# Patient Record
Sex: MALE | Race: BLACK OR AFRICAN AMERICAN | NOT HISPANIC OR LATINO | Employment: FULL TIME | URBAN - METROPOLITAN AREA
[De-identification: names, ages, dates, MRNs, and addresses within clinical notes are randomized per-mention and may not be internally consistent; named-entity substitution may affect disease eponyms.]

---

## 2023-04-12 PROBLEM — R05.1 ACUTE COUGH: Status: ACTIVE | Noted: 2023-04-12

## 2023-04-22 ENCOUNTER — APPOINTMENT (OUTPATIENT)
Dept: LAB | Facility: HOSPITAL | Age: 38
End: 2023-04-22

## 2023-04-22 DIAGNOSIS — Z76.89 ENCOUNTER TO ESTABLISH CARE: ICD-10-CM

## 2023-04-22 LAB
ALBUMIN SERPL BCP-MCNC: 4.1 G/DL (ref 3.5–5)
ALP SERPL-CCNC: 54 U/L (ref 34–104)
ALT SERPL W P-5'-P-CCNC: 25 U/L (ref 7–52)
ANION GAP SERPL CALCULATED.3IONS-SCNC: 9 MMOL/L (ref 4–13)
AST SERPL W P-5'-P-CCNC: 16 U/L (ref 13–39)
BASOPHILS # BLD AUTO: 0.02 THOUSANDS/ΜL (ref 0–0.1)
BASOPHILS NFR BLD AUTO: 0 % (ref 0–1)
BILIRUB SERPL-MCNC: 0.64 MG/DL (ref 0.2–1)
BUN SERPL-MCNC: 14 MG/DL (ref 5–25)
CALCIUM SERPL-MCNC: 10.1 MG/DL (ref 8.4–10.2)
CHLORIDE SERPL-SCNC: 104 MMOL/L (ref 96–108)
CHOLEST SERPL-MCNC: 180 MG/DL
CO2 SERPL-SCNC: 29 MMOL/L (ref 21–32)
CREAT SERPL-MCNC: 1.12 MG/DL (ref 0.6–1.3)
EOSINOPHIL # BLD AUTO: 0.05 THOUSAND/ΜL (ref 0–0.61)
EOSINOPHIL NFR BLD AUTO: 0 % (ref 0–6)
ERYTHROCYTE [DISTWIDTH] IN BLOOD BY AUTOMATED COUNT: 14.3 % (ref 11.6–15.1)
GFR SERPL CREATININE-BSD FRML MDRD: 82 ML/MIN/1.73SQ M
GLUCOSE P FAST SERPL-MCNC: 80 MG/DL (ref 65–99)
HCT VFR BLD AUTO: 40.7 % (ref 36.5–49.3)
HDLC SERPL-MCNC: 64 MG/DL
HGB BLD-MCNC: 12.4 G/DL (ref 12–17)
IMM GRANULOCYTES # BLD AUTO: 0.04 THOUSAND/UL (ref 0–0.2)
IMM GRANULOCYTES NFR BLD AUTO: 0 % (ref 0–2)
LDLC SERPL CALC-MCNC: 108 MG/DL (ref 0–100)
LYMPHOCYTES # BLD AUTO: 1.28 THOUSANDS/ΜL (ref 0.6–4.47)
LYMPHOCYTES NFR BLD AUTO: 9 % (ref 14–44)
MCH RBC QN AUTO: 27.2 PG (ref 26.8–34.3)
MCHC RBC AUTO-ENTMCNC: 30.5 G/DL (ref 31.4–37.4)
MCV RBC AUTO: 89 FL (ref 82–98)
MONOCYTES # BLD AUTO: 0.29 THOUSAND/ΜL (ref 0.17–1.22)
MONOCYTES NFR BLD AUTO: 2 % (ref 4–12)
NEUTROPHILS # BLD AUTO: 12.42 THOUSANDS/ΜL (ref 1.85–7.62)
NEUTS SEG NFR BLD AUTO: 89 % (ref 43–75)
NONHDLC SERPL-MCNC: 116 MG/DL
NRBC BLD AUTO-RTO: 0 /100 WBCS
PLATELET # BLD AUTO: 207 THOUSANDS/UL (ref 149–390)
PMV BLD AUTO: 10.5 FL (ref 8.9–12.7)
POTASSIUM SERPL-SCNC: 4.6 MMOL/L (ref 3.5–5.3)
PROT SERPL-MCNC: 7.2 G/DL (ref 6.4–8.4)
RBC # BLD AUTO: 4.56 MILLION/UL (ref 3.88–5.62)
SODIUM SERPL-SCNC: 142 MMOL/L (ref 135–147)
TRIGL SERPL-MCNC: 38 MG/DL
TSH SERPL DL<=0.05 MIU/L-ACNC: 0.68 UIU/ML (ref 0.45–4.5)
WBC # BLD AUTO: 14.1 THOUSAND/UL (ref 4.31–10.16)

## 2023-04-26 ENCOUNTER — APPOINTMENT (OUTPATIENT)
Dept: LAB | Facility: HOSPITAL | Age: 38
End: 2023-04-26

## 2023-04-26 ENCOUNTER — HOSPITAL ENCOUNTER (OUTPATIENT)
Dept: CT IMAGING | Facility: HOSPITAL | Age: 38
Discharge: HOME | End: 2023-04-26

## 2023-04-26 ENCOUNTER — OFFICE VISIT (OUTPATIENT)
Dept: INTERNAL MEDICINE CLINIC | Facility: CLINIC | Age: 38
End: 2023-04-26

## 2023-04-26 VITALS
TEMPERATURE: 97 F | HEART RATE: 74 BPM | HEIGHT: 72 IN | WEIGHT: 157 LBS | OXYGEN SATURATION: 99 % | RESPIRATION RATE: 16 BRPM | BODY MASS INDEX: 21.26 KG/M2 | DIASTOLIC BLOOD PRESSURE: 60 MMHG | SYSTOLIC BLOOD PRESSURE: 100 MMHG

## 2023-04-26 DIAGNOSIS — R05.2 SUBACUTE COUGH: ICD-10-CM

## 2023-04-26 DIAGNOSIS — R68.83 CHILLS: Primary | ICD-10-CM

## 2023-04-26 DIAGNOSIS — R05.1 ACUTE COUGH: ICD-10-CM

## 2023-04-26 DIAGNOSIS — R68.83 CHILLS: ICD-10-CM

## 2023-04-26 DIAGNOSIS — R07.81 PLEURITIC CHEST PAIN: Primary | ICD-10-CM

## 2023-04-26 DIAGNOSIS — R07.81 PLEURITIC CHEST PAIN: ICD-10-CM

## 2023-04-26 DIAGNOSIS — R51.9 ACUTE NONINTRACTABLE HEADACHE, UNSPECIFIED HEADACHE TYPE: ICD-10-CM

## 2023-04-26 LAB
BASOPHILS # BLD AUTO: 0.03 THOUSANDS/ΜL (ref 0–0.1)
BASOPHILS NFR BLD AUTO: 1 % (ref 0–1)
CRP SERPL QL: 65.7 MG/L
EOSINOPHIL # BLD AUTO: 0.08 THOUSAND/ΜL (ref 0–0.61)
EOSINOPHIL NFR BLD AUTO: 2 % (ref 0–6)
ERYTHROCYTE [DISTWIDTH] IN BLOOD BY AUTOMATED COUNT: 13.8 % (ref 11.6–15.1)
ERYTHROCYTE [SEDIMENTATION RATE] IN BLOOD: 88 MM/HOUR (ref 0–14)
HBV CORE AB SER QL: NORMAL
HBV CORE IGM SER QL: NORMAL
HBV SURFACE AG SER QL: NORMAL
HCT VFR BLD AUTO: 42.1 % (ref 36.5–49.3)
HCV AB SER QL: NORMAL
HGB BLD-MCNC: 12.5 G/DL (ref 12–17)
HIV 1+2 AB+HIV1 P24 AG SERPL QL IA: NORMAL
HIV 2 AB SERPL QL IA: NORMAL
HIV1 AB SERPL QL IA: NORMAL
HIV1 P24 AG SERPL QL IA: NORMAL
IMM GRANULOCYTES # BLD AUTO: 0.02 THOUSAND/UL (ref 0–0.2)
IMM GRANULOCYTES NFR BLD AUTO: 0 % (ref 0–2)
LDH SERPL-CCNC: 140 U/L (ref 140–271)
LYMPHOCYTES # BLD AUTO: 1.39 THOUSANDS/ΜL (ref 0.6–4.47)
LYMPHOCYTES NFR BLD AUTO: 26 % (ref 14–44)
MCH RBC QN AUTO: 26.9 PG (ref 26.8–34.3)
MCHC RBC AUTO-ENTMCNC: 29.7 G/DL (ref 31.4–37.4)
MCV RBC AUTO: 91 FL (ref 82–98)
MONOCYTES # BLD AUTO: 0.44 THOUSAND/ΜL (ref 0.17–1.22)
MONOCYTES NFR BLD AUTO: 8 % (ref 4–12)
NEUTROPHILS # BLD AUTO: 3.43 THOUSANDS/ΜL (ref 1.85–7.62)
NEUTS SEG NFR BLD AUTO: 63 % (ref 43–75)
NRBC BLD AUTO-RTO: 0 /100 WBCS
PLATELET # BLD AUTO: 224 THOUSANDS/UL (ref 149–390)
PMV BLD AUTO: 10.4 FL (ref 8.9–12.7)
RBC # BLD AUTO: 4.64 MILLION/UL (ref 3.88–5.62)
WBC # BLD AUTO: 5.39 THOUSAND/UL (ref 4.31–10.16)

## 2023-04-26 RX ADMIN — IOHEXOL 85 ML: 350 INJECTION, SOLUTION INTRAVENOUS at 10:52

## 2023-04-26 NOTE — PROGRESS NOTES
INTERNAL MEDICINE OFFICE VISIT  [facility] Internal Medicine- [place]    NAME: Christi Caldera  AGE: 43 y o  SEX: male    DATE OF ENCOUNTER: 4/26/2023    Assessment and Plan/History of Present Illness     Here today for same-day appointment  No significant past medical history    Here for reevaluation of pleuritic chest pain, chills, headaches, ongoing cough, postnasal drip  See prior office note for details  Patient has had ongoing cough for about 2 months at this point  Productive of thick yellowish mucus  Had 2 trips to urgent care, initially treated with prednisone/Z-Sami and later Medrol Dosepak  Some initial improvement with Flonase, Mucinex, Sudafed  Having severe episodes of chills to the point where he has had to layer up and take hot showers  Headaches described as a pressure sensation in the center of the forehead, no associated photophobia or phonophobia    Felt fine this last week but had recurrence of symptoms this prior Friday  He has been having intermittent pleuritic chest discomfort and difficulty with taking deep breaths  This provokes coughing episodes  Continues to have issues with intermittent chills, headache, postnasal drip with sore throat, cough with thick yellowish mucus  He has not checked his temperature at home    He denies any rash, lymphadenopathy, dysphagia, nausea, vomiting, diarrhea, dysuria  His daughter has been in  for the past 6 months  He did take a trip to [facility] about 1 month ago for a friend's wedding  He initially tested for COVID/flu/RSV when he went to urgent care back in February  He denies any contact with homeless/incarcerated population  No history of IV drug use  He denies any family history of clotting events or pulmonary embolism    Recent chest x-ray unremarkable  Basic labs mostly unremarkable, mild leukocytosis, neutrophil predominant  LFTs within normal limits      1  Pleuritic chest pain  -Cause of symptoms is unclear    I still think it is most likely this is related to sequelae of a viral syndrome  Given his recurrent episodes of pleuritic chest pain, would like to rule out PE with CTA  -We will obtain additional blood work to evaluate for infectious and inflammatory sources  -Will treat symptomatically pending the above workup     - CTA chest pe study; Future    2  Chills  - EBV acute panel; Future  - Mononucleosis screen; Future  - Sedimentation rate, automated; Future  - C-reactive protein; Future  - LD,Blood; Future  - CBC and differential; Future  - : HIV 1/2 AB/AG w Reflex [facility] for 2 yr old and above; Future  - Chronic Hepatitis Panel; Future    3  Acute nonintractable headache, unspecified headache type    4  Acute cough    5  Subacute cough  - B pertussis IgG/IgM Ab; Future          Orders Placed This Encounter   Procedures   • CTA chest pe study   • EBV acute panel   • Mononucleosis screen   • Sedimentation rate, automated   • C-reactive protein   • LD,Blood   • CBC and differential   • : HIV 1/2 AB/AG w Reflex [facility] for 2 yr old and above   • Chronic Hepatitis Panel   • B pertussis IgG/IgM Ab       Chief Complaint     Chief Complaint   Patient presents with   • Follow-up     Body aches/ fever/headaches       Review of Systems     10 point ROS negative except per HPI    The following portions of the patient's history were reviewed and updated as appropriate: allergies, current medications, past family history, past medical history, past social history, past surgical history and problem list     Active Problem List     Patient Active Problem List   Diagnosis   • Acute cough       Objective     /60 (BP Location: Left arm, Patient Position: Sitting, Cuff Size: Standard)   Pulse 74   Temp (!) 97 °F (36 1 °C)   Resp 16   Ht 6' (1 829 m)   Wt 71 2 kg (157 lb)   SpO2 99%   BMI 21 29 kg/m²     Physical Exam  Constitutional:       Appearance: Normal appearance  He is not ill-appearing     HENT:      Head: Normocephalic and atraumatic  Eyes:      General: No scleral icterus  Right eye: No discharge  Left eye: No discharge  Cardiovascular:      Rate and Rhythm: Normal rate and regular rhythm  Heart sounds: No murmur heard  No friction rub  Pulmonary:      Effort: Pulmonary effort is normal       Breath sounds: Normal breath sounds  No wheezing or rales  Musculoskeletal:         General: No swelling or tenderness  Skin:     Findings: No erythema or rash  Neurological:      Mental Status: He is alert  Mental status is at baseline  Gait: Gait normal    Psychiatric:         Mood and Affect: Mood normal          Behavior: Behavior normal          Pertinent Laboratory/Diagnostic Studies:  XR chest pa & lateral    Result Date: 4/23/2023  Impression: No acute cardiopulmonary disease  Workstation performed: [id number]       Images and diagnostics reviewed     Current Medications     Current Outpatient Medications:   •  benzonatate (TESSALON PERLES) 100 mg capsule, Take 1 capsule (100 mg total) by mouth 3 (three) times a day as needed for cough, Disp: 20 capsule, Rfl: 0    Health Maintenance     Health Maintenance   Topic Date Due   • Hepatitis C Screening  Never done   • COVID-19 Vaccine (1) Never done   • HIV Screening  Never done   • Annual Physical  Never done   • DTaP,Tdap,and Td Vaccines (1 - Tdap) Never done   • Influenza Vaccine (1) 06/30/2023 (Originally 9/1/2022)   • Depression Screening  04/12/2024   • BMI: Adult  04/26/2024   • Pneumococcal Vaccine: Pediatrics (0 to 5 Years) and At-Risk Patients (6 to 59 Years)  Aged Out   • HIB Vaccine  Aged Out   • IPV Vaccine  Aged Out   • Hepatitis A Vaccine  Aged Out   • Meningococcal ACWY Vaccine  Aged Out   • HPV Vaccine  Aged Out       There is no immunization history on file for this patient  BOBO Shankar  Internal Medicine [address], [facility] [address]  [place], [address] [facility]  Office: [phone]  Fax: [phone]

## 2023-04-27 LAB
EBV NA IGG SER IA-ACNC: 286 U/ML (ref 0–17.9)
EBV VCA IGG SER IA-ACNC: >600 U/ML (ref 0–17.9)
EBV VCA IGM SER IA-ACNC: <36 U/ML (ref 0–35.9)
HETEROPH AB SER QL: NEGATIVE
INTERPRETATION: ABNORMAL

## 2023-04-28 LAB
B PERT IGA SER QL IA: 1.7 INDEX (ref 0–0.9)
B PERT IGG SER-ACNC: 3.77 INDEX (ref 0–0.94)
B PERT IGM SER QL IA: <1 INDEX (ref 0–0.9)

## 2023-05-22 ENCOUNTER — PATIENT MESSAGE (OUTPATIENT)
Dept: INTERNAL MEDICINE CLINIC | Facility: CLINIC | Age: 38
End: 2023-05-22

## 2023-05-22 DIAGNOSIS — J06.9 UPPER RESPIRATORY TRACT INFECTION, UNSPECIFIED TYPE: ICD-10-CM

## 2023-05-22 DIAGNOSIS — M79.10 MYALGIA: Primary | ICD-10-CM

## 2023-05-23 RX ORDER — PREDNISONE 20 MG/1
40 TABLET ORAL DAILY
Qty: 10 TABLET | Refills: 0 | Status: SHIPPED | OUTPATIENT
Start: 2023-05-23 | End: 2023-05-28

## 2023-05-23 RX ORDER — AZITHROMYCIN 250 MG/1
TABLET, FILM COATED ORAL
Qty: 6 TABLET | Refills: 0 | Status: SHIPPED | OUTPATIENT
Start: 2023-05-23 | End: 2023-05-28

## 2023-05-24 NOTE — TELEPHONE ENCOUNTER
From: Gerber Frederick  To: Jaspreet Michellesoledad Madden  Sent: 5/22/2023 4:32 PM EDT  Subject: Recurring Issue     Juan A Gonzalez I woke up at about 2am with the same issue  Not being able to take deep breaths, chills and a headache  I took some Sudafed and the chills went away but the difficulty taking deep breaths, headache and body aches have persisted all day  I did not seek medical attention because I have my daughter today and she started off the day battling a fever  I took Tylenol for the headache, that helped but it's still lingering  I'm started to get worried and frustrated by this  I reached out to my landlord to have a radon test ran in my apartment but I'm running out ideas

## 2023-06-11 PROBLEM — R05.1 ACUTE COUGH: Status: RESOLVED | Noted: 2023-04-12 | Resolved: 2023-06-11

## 2023-12-27 ENCOUNTER — OFFICE VISIT (OUTPATIENT)
Dept: INTERNAL MEDICINE CLINIC | Facility: CLINIC | Age: 38
End: 2023-12-27
Payer: COMMERCIAL

## 2023-12-27 VITALS
DIASTOLIC BLOOD PRESSURE: 62 MMHG | HEIGHT: 73 IN | WEIGHT: 164 LBS | BODY MASS INDEX: 21.74 KG/M2 | HEART RATE: 80 BPM | TEMPERATURE: 98.4 F | RESPIRATION RATE: 13 BRPM | SYSTOLIC BLOOD PRESSURE: 124 MMHG

## 2023-12-27 DIAGNOSIS — R05.2 SUBACUTE COUGH: ICD-10-CM

## 2023-12-27 DIAGNOSIS — J30.2 SEASONAL ALLERGIC RHINITIS, UNSPECIFIED TRIGGER: Primary | ICD-10-CM

## 2023-12-27 PROCEDURE — 99214 OFFICE O/P EST MOD 30 MIN: CPT | Performed by: INTERNAL MEDICINE

## 2023-12-27 RX ORDER — AZITHROMYCIN 250 MG/1
TABLET, FILM COATED ORAL DAILY
Qty: 6 TABLET | Refills: 0 | Status: SHIPPED | OUTPATIENT
Start: 2023-12-27 | End: 2024-01-01

## 2023-12-27 RX ORDER — LORATADINE 10 MG/1
10 TABLET ORAL DAILY
Qty: 90 TABLET | Refills: 0 | Status: SHIPPED | OUTPATIENT
Start: 2023-12-27

## 2023-12-27 RX ORDER — FLUTICASONE PROPIONATE 50 MCG
1 SPRAY, SUSPENSION (ML) NASAL DAILY
Qty: 15.8 ML | Refills: 0 | Status: SHIPPED | OUTPATIENT
Start: 2023-12-27

## 2023-12-27 RX ORDER — PREDNISONE 20 MG/1
20 TABLET ORAL DAILY
Qty: 5 TABLET | Refills: 0 | Status: SHIPPED | OUTPATIENT
Start: 2023-12-27 | End: 2024-01-01

## 2023-12-27 NOTE — PROGRESS NOTES
INTERNAL MEDICINE OFFICE VISIT  [facility] Internal Medicine- [place]    NAME: Pio Jacobo  AGE: 38 y.o. SEX: male    DATE OF ENCOUNTER: 12/27/2023    Assessment and Plan/History of Present Illness     Here today for sick visit    Patient reports intermittent sore throat, postnasal drip since mid November.  Slight shortness of breath and cough.  Denies any fevers or chills.  He had similar prolonged symptoms last spring likely related to episode of bronchitis.  Does not smoke cigarettes, will occasionally smoke marijuana.  No prior history of asthma    Exam unremarkable today.  Lungs clear to auscultation    Plan:  -Symptoms possibly multifactorial.  Likely allergic rhinitis.  Treat with Flonase and oral antihistamine  -Given his concerns about possible recurrence of bronchitis, we will treat with course of prednisone and azithromycin  -Check PFTs to evaluate for underlying lung disease      1. Seasonal allergic rhinitis, unspecified trigger  -     fluticasone (FLONASE) 50 mcg/act nasal spray; 1 spray into each nostril daily  -     loratadine (CLARITIN) 10 mg tablet; Take 1 tablet (10 mg total) by mouth daily    2. Subacute cough  -     Complete PFT with post bronchodilator; Future  -     azithromycin (Zithromax) 250 mg tablet; Take 2 tablets (500 mg total) by mouth daily for 1 day, THEN 1 tablet (250 mg total) daily for 4 days.  -     predniSONE 20 mg tablet; Take 1 tablet (20 mg total) by mouth daily for 5 days               Orders Placed This Encounter   Procedures   • Complete PFT with post bronchodilator       Chief Complaint     Chief Complaint   Patient presents with   • Sore Throat   • post nasal drip   • Breathing Difficulty     Had bronchitis last year, he does not want to get that sick again.       Review of Systems     10 point ROS negative except per HPI    The following portions of the patient's history were reviewed and updated as appropriate: allergies, current medications, past family history, past  "medical history, past social history, past surgical history and problem list.    Active Problem List     Patient Active Problem List   Diagnosis   (none) - all problems resolved or deleted       Objective     /62 (BP Location: Left arm, Patient Position: Sitting, Cuff Size: Standard)   Pulse 80   Temp 98.4 °F (36.9 °C)   Resp 13   Ht 6' 1\" (1.854 m)   Wt 74.4 kg (164 lb)   BMI 21.64 kg/m²     Physical Exam  HENT:      Right Ear: Tympanic membrane normal.      Left Ear: Tympanic membrane normal.      Mouth/Throat:      Mouth: Mucous membranes are moist. No oral lesions.      Pharynx: No oropharyngeal exudate.      Tonsils: No tonsillar exudate.   Cardiovascular:      Rate and Rhythm: Normal rate and regular rhythm.      Heart sounds: Normal heart sounds. No murmur heard.  Pulmonary:      Effort: Pulmonary effort is normal.      Breath sounds: Normal breath sounds. No wheezing, rhonchi or rales.         Pertinent Laboratory/Diagnostic Studies:  CTA chest pe study    Result Date: 4/26/2023  Impression: 1. No pulmonary embolism or acute intrathoracic process. 2. Bibasilar linear subsegmental atelectasis/scarring and pulmonary cysts. Workstation performed: [id number]       Images and diagnostics reviewed     Current Medications     Current Outpatient Medications:   •  azithromycin (Zithromax) 250 mg tablet, Take 2 tablets (500 mg total) by mouth daily for 1 day, THEN 1 tablet (250 mg total) daily for 4 days., Disp: 6 tablet, Rfl: 0  •  fluticasone (FLONASE) 50 mcg/act nasal spray, 1 spray into each nostril daily, Disp: 15.8 mL, Rfl: 0  •  loratadine (CLARITIN) 10 mg tablet, Take 1 tablet (10 mg total) by mouth daily, Disp: 90 tablet, Rfl: 0  •  predniSONE 20 mg tablet, Take 1 tablet (20 mg total) by mouth daily for 5 days, Disp: 5 tablet, Rfl: 0    Health Maintenance     Health Maintenance   Topic Date Due   • COVID-19 Vaccine (1) Never done   • Annual Physical  Never done   • DTaP,Tdap,and Td Vaccines (1 - " Tdap) Never done   • Influenza Vaccine (1) Never done   • Depression Screening  04/12/2024   • BMI: Adult  04/26/2024   • HIV Screening  Completed   • Hepatitis C Screening  Completed   • Pneumococcal Vaccine: Pediatrics (0 to 5 Years) and At-Risk Patients (6 to 64 Years)  Aged Out   • HIB Vaccine  Aged Out   • IPV Vaccine  Aged Out   • Hepatitis A Vaccine  Aged Out   • Meningococcal ACWY Vaccine  Aged Out   • HPV Vaccine  Aged Out       There is no immunization history on file for this patient.    Jaspreet Rene D.O.  [facility] [place] [address]  Office: [phone]  Fax: [phone]

## 2024-01-08 ENCOUNTER — HOSPITAL ENCOUNTER (OUTPATIENT)
Dept: PULMONOLOGY | Facility: HOSPITAL | Age: 39
Discharge: HOME | End: 2024-01-08
Attending: INTERNAL MEDICINE
Payer: COMMERCIAL

## 2024-01-08 DIAGNOSIS — R05.2 SUBACUTE COUGH: ICD-10-CM

## 2024-01-08 PROCEDURE — 94726 PLETHYSMOGRAPHY LUNG VOLUMES: CPT

## 2024-01-08 PROCEDURE — 94729 DIFFUSING CAPACITY: CPT | Performed by: INTERNAL MEDICINE

## 2024-01-08 PROCEDURE — 94060 EVALUATION OF WHEEZING: CPT

## 2024-01-08 PROCEDURE — 94729 DIFFUSING CAPACITY: CPT

## 2024-01-08 PROCEDURE — 94060 EVALUATION OF WHEEZING: CPT | Performed by: INTERNAL MEDICINE

## 2024-01-08 PROCEDURE — 94726 PLETHYSMOGRAPHY LUNG VOLUMES: CPT | Performed by: INTERNAL MEDICINE

## 2024-01-08 PROCEDURE — 94760 N-INVAS EAR/PLS OXIMETRY 1: CPT

## 2024-01-08 RX ORDER — ALBUTEROL SULFATE 2.5 MG/3ML
2.5 SOLUTION RESPIRATORY (INHALATION) ONCE
Status: COMPLETED | OUTPATIENT
Start: 2024-01-08 | End: 2024-01-08

## 2024-01-08 RX ADMIN — ALBUTEROL SULFATE 2.5 MG: 2.5 SOLUTION RESPIRATORY (INHALATION) at 10:13

## 2024-05-28 ENCOUNTER — TELEPHONE (OUTPATIENT)
Age: 39
End: 2024-05-28

## 2024-05-28 DIAGNOSIS — J06.9 ACUTE URI: Primary | ICD-10-CM

## 2024-05-28 NOTE — TELEPHONE ENCOUNTER
Pt called in with the following symptoms for past week is getting worse now. Sore throat, cold , congestion and shortness of breath headache. Offered to schedule an appt he mentioned unable to make any days in this week due to his work. Requesting if Dr. Rene would prescribe him to Zpack and call in the med to his regular pharmacy. And call back the patient to update on the same please. Thanks

## 2024-05-29 RX ORDER — AZITHROMYCIN 250 MG/1
TABLET, FILM COATED ORAL
Qty: 6 TABLET | Refills: 0 | Status: SHIPPED | OUTPATIENT
Start: 2024-05-29 | End: 2024-06-03

## 2024-06-14 ENCOUNTER — OFFICE VISIT (OUTPATIENT)
Dept: INTERNAL MEDICINE CLINIC | Facility: CLINIC | Age: 39
End: 2024-06-14
Payer: COMMERCIAL

## 2024-06-14 VITALS
WEIGHT: 181 LBS | TEMPERATURE: 97.3 F | DIASTOLIC BLOOD PRESSURE: 80 MMHG | HEART RATE: 121 BPM | HEIGHT: 73 IN | RESPIRATION RATE: 18 BRPM | OXYGEN SATURATION: 96 % | SYSTOLIC BLOOD PRESSURE: 140 MMHG | BODY MASS INDEX: 23.99 KG/M2

## 2024-06-14 DIAGNOSIS — Z00.00 ANNUAL PHYSICAL EXAM: Primary | ICD-10-CM

## 2024-06-14 PROCEDURE — 99396 PREV VISIT EST AGE 40-64: CPT | Performed by: INTERNAL MEDICINE

## 2024-06-14 NOTE — PROGRESS NOTES
Adult Annual Physical  Name: Pio Jacobo      : 1985      MRN: [id number]  Encounter Provider: Jaspreet Rene DO  Encounter Date: 2024   Encounter department: [facility] INTERNAL MEDICINE [facility]    Here today for annual physical    He does not have any significant concerns today.  Has had some weight gain since our last visit.  Office weight of 181 pounds today, last April he was closer to 157 pounds.  He had been on night shift for approximately 6 months and had some bad eating habits.  He is now back on dayshift and will be focusing on improving eating habits.  Initially had some anxiety and sleeping difficulties when transitioning back to dayshift but it seems this is improving    No findings of concern on exam  Will obtain routine labs    Return to the office in 1 year, sooner if needed    Assessment & Plan   1. Annual physical exam  -     CBC and differential; Future  -     Comprehensive metabolic panel; Future  -     Lipid Panel with Direct LDL reflex; Future  -     TSH, 3rd generation with Free T4 reflex; Future  -     Hemoglobin A1C; Future  Immunizations and preventive care screenings were discussed with patient today. Appropriate education was printed on patient's after visit summary.    Discussed risks and benefits of prostate cancer screening. We discussed the controversial history of PSA screening for prostate cancer in the [place] as well as the risk of over detection and over treatment of prostate cancer by way of PSA screening.  The patient understands that PSA blood testing is an imperfect way to screen for prostate cancer and that elevated PSA levels in the blood may also be caused by infection, inflammation, prostatic trauma or manipulation, urological procedures, or by benign prostatic enlargement.    The role of the digital rectal examination in prostate cancer screening was also discussed and I discussed with him that there is large interobserver variability in the  "findings of digital rectal examination.    Counseling:  Alcohol/drug use: discussed moderation in alcohol intake, the recommendations for healthy alcohol use, and avoidance of illicit drug use.  Dental Health: discussed importance of regular tooth brushing, flossing, and dental visits.  Injury prevention: discussed safety/seat belts, safety helmets, smoke detectors, carbon dioxide detectors, and smoking near bedding or upholstery.  Sexual health: discussed sexually transmitted diseases, partner selection, use of condoms, avoidance of unintended pregnancy, and contraceptive alternatives.  Exercise: the importance of regular exercise/physical activity was discussed. Recommend exercise 3-5 times per week for at least 30 minutes.         History of Present Illness     Adult Annual Physical:  Patient presents for annual physical.     Depression Screening:  - PHQ-2 Score: 0    General Health:    - Hearing: normal hearing right ear and normal hearing left ear.  - Vision: no vision problems.  - Dental: regular dental visits.    Review of Systems      Objective     /80 (BP Location: Left arm, Patient Position: Sitting, Cuff Size: Standard)   Pulse (!) 121   Temp (!) 97.3 °F (36.3 °C)   Resp 18   Ht 6' 1\" (1.854 m)   Wt 82.1 kg (181 lb)   SpO2 96%   BMI 23.88 kg/m²     Physical Exam  Constitutional:       Appearance: Normal appearance. He is not ill-appearing.   HENT:      Head: Normocephalic and atraumatic.   Eyes:      General: No scleral icterus.        Right eye: No discharge.         Left eye: No discharge.   Cardiovascular:      Rate and Rhythm: Normal rate and regular rhythm.      Heart sounds: No murmur heard.     No friction rub.   Pulmonary:      Effort: Pulmonary effort is normal.      Breath sounds: Normal breath sounds. No wheezing or rales.   Abdominal:      General: Abdomen is flat. There is no distension.      Palpations: Abdomen is soft.      Tenderness: There is no abdominal tenderness. "   Musculoskeletal:         General: No swelling, tenderness or deformity.   Skin:     General: Skin is warm and dry.      Findings: No erythema.   Neurological:      Mental Status: He is alert and oriented to person, place, and time. Mental status is at baseline.      Motor: No weakness.   Psychiatric:         Mood and Affect: Mood normal.         Behavior: Behavior normal.       Administrative Statements

## 2024-06-15 ENCOUNTER — APPOINTMENT (OUTPATIENT)
Dept: LAB | Facility: HOSPITAL | Age: 39
End: 2024-06-15
Payer: COMMERCIAL

## 2024-06-15 DIAGNOSIS — Z00.00 ANNUAL PHYSICAL EXAM: ICD-10-CM

## 2024-06-15 LAB
ALBUMIN SERPL BCP-MCNC: 4.5 G/DL (ref 3.5–5)
ALP SERPL-CCNC: 44 U/L (ref 34–104)
ALT SERPL W P-5'-P-CCNC: 26 U/L (ref 7–52)
ANION GAP SERPL CALCULATED.3IONS-SCNC: 10 MMOL/L (ref 4–13)
AST SERPL W P-5'-P-CCNC: 21 U/L (ref 13–39)
BASOPHILS # BLD AUTO: 0.02 THOUSANDS/ÂΜL (ref 0–0.1)
BASOPHILS NFR BLD AUTO: 1 % (ref 0–1)
BILIRUB SERPL-MCNC: 0.56 MG/DL (ref 0.2–1)
BUN SERPL-MCNC: 15 MG/DL (ref 5–25)
CALCIUM SERPL-MCNC: 9.7 MG/DL (ref 8.4–10.2)
CHLORIDE SERPL-SCNC: 101 MMOL/L (ref 96–108)
CHOLEST SERPL-MCNC: 215 MG/DL
CO2 SERPL-SCNC: 29 MMOL/L (ref 21–32)
CREAT SERPL-MCNC: 1.12 MG/DL (ref 0.6–1.3)
EOSINOPHIL # BLD AUTO: 0.06 THOUSAND/ÂΜL (ref 0–0.61)
EOSINOPHIL NFR BLD AUTO: 2 % (ref 0–6)
ERYTHROCYTE [DISTWIDTH] IN BLOOD BY AUTOMATED COUNT: 13 % (ref 11.6–15.1)
EST. AVERAGE GLUCOSE BLD GHB EST-MCNC: 131 MG/DL
GFR SERPL CREATININE-BSD FRML MDRD: 81 ML/MIN/1.73SQ M
GLUCOSE P FAST SERPL-MCNC: 91 MG/DL (ref 65–99)
HBA1C MFR BLD: 6.2 %
HCT VFR BLD AUTO: 43.1 % (ref 36.5–49.3)
HDLC SERPL-MCNC: 47 MG/DL
HGB BLD-MCNC: 13.2 G/DL (ref 12–17)
IMM GRANULOCYTES # BLD AUTO: 0 THOUSAND/UL (ref 0–0.2)
IMM GRANULOCYTES NFR BLD AUTO: 0 % (ref 0–2)
LDLC SERPL CALC-MCNC: 145 MG/DL (ref 0–100)
LYMPHOCYTES # BLD AUTO: 1.41 THOUSANDS/ÂΜL (ref 0.6–4.47)
LYMPHOCYTES NFR BLD AUTO: 47 % (ref 14–44)
MCH RBC QN AUTO: 27.7 PG (ref 26.8–34.3)
MCHC RBC AUTO-ENTMCNC: 30.6 G/DL (ref 31.4–37.4)
MCV RBC AUTO: 90 FL (ref 82–98)
MONOCYTES # BLD AUTO: 0.33 THOUSAND/ÂΜL (ref 0.17–1.22)
MONOCYTES NFR BLD AUTO: 11 % (ref 4–12)
NEUTROPHILS # BLD AUTO: 1.16 THOUSANDS/ÂΜL (ref 1.85–7.62)
NEUTS SEG NFR BLD AUTO: 39 % (ref 43–75)
NRBC BLD AUTO-RTO: 0 /100 WBCS
PLATELET # BLD AUTO: 209 THOUSANDS/UL (ref 149–390)
PMV BLD AUTO: 10.6 FL (ref 8.9–12.7)
POTASSIUM SERPL-SCNC: 4.1 MMOL/L (ref 3.5–5.3)
PROT SERPL-MCNC: 7.9 G/DL (ref 6.4–8.4)
RBC # BLD AUTO: 4.77 MILLION/UL (ref 3.88–5.62)
SODIUM SERPL-SCNC: 140 MMOL/L (ref 135–147)
TRIGL SERPL-MCNC: 115 MG/DL
TSH SERPL DL<=0.05 MIU/L-ACNC: 1.07 UIU/ML (ref 0.45–4.5)
WBC # BLD AUTO: 2.98 THOUSAND/UL (ref 4.31–10.16)

## 2024-06-15 PROCEDURE — 84443 ASSAY THYROID STIM HORMONE: CPT

## 2024-06-15 PROCEDURE — 83036 HEMOGLOBIN GLYCOSYLATED A1C: CPT

## 2024-06-15 PROCEDURE — 80061 LIPID PANEL: CPT

## 2024-06-15 PROCEDURE — 80053 COMPREHEN METABOLIC PANEL: CPT

## 2024-06-15 PROCEDURE — 85025 COMPLETE CBC W/AUTO DIFF WBC: CPT

## 2024-06-15 PROCEDURE — 36415 COLL VENOUS BLD VENIPUNCTURE: CPT

## 2024-06-16 DIAGNOSIS — E78.5 DYSLIPIDEMIA: ICD-10-CM

## 2024-06-16 DIAGNOSIS — R73.03 PREDIABETES: Primary | ICD-10-CM

## 2024-08-28 ENCOUNTER — OFFICE VISIT (OUTPATIENT)
Dept: INTERNAL MEDICINE CLINIC | Facility: CLINIC | Age: 39
End: 2024-08-28
Payer: COMMERCIAL

## 2024-08-28 VITALS
HEIGHT: 73 IN | RESPIRATION RATE: 18 BRPM | BODY MASS INDEX: 23.99 KG/M2 | DIASTOLIC BLOOD PRESSURE: 80 MMHG | HEART RATE: 94 BPM | OXYGEN SATURATION: 96 % | SYSTOLIC BLOOD PRESSURE: 120 MMHG | WEIGHT: 181 LBS | TEMPERATURE: 97 F

## 2024-08-28 DIAGNOSIS — K62.5 RECTAL BLEEDING: Primary | ICD-10-CM

## 2024-08-28 PROCEDURE — 99214 OFFICE O/P EST MOD 30 MIN: CPT | Performed by: INTERNAL MEDICINE

## 2024-08-28 NOTE — PROGRESS NOTES
INTERNAL MEDICINE OFFICE VISIT  [facility] Internal Medicine- [place]    NAME: Pio Jacobo  AGE: 39 y.o. SEX: male    DATE OF ENCOUNTER: 8/28/2024    Assessment and Plan/History of Present Illness     Here today for evaluation of rectal bleeding    Patient reports onset of rectal bleeding 3 or 4 days ago.  He has noticed a significant amount of blood in the toilet bowl with bowel movements in addition to blood with wiping.  Denies any prior history of GI bleed.  States he had surgery for testicular torsion 8 years ago and had a history of hemorrhoids after that but only had blood on the toilet paper with wiping    He is not having any pain with bowel movements.  He does have a burning sensation in the right lower abdominal area.  Denies any fevers, chills.  No recent ingestion of undercooked meats or exotic foods.  No recent travel.  He states his mom who is 59 years old was diagnosed with Crohn's colitis earlier this year.    On exam, there is possible evidence of a external hemorrhoid versus skin tag in the 10 o'clock position    Patient has not had colonoscopy previously    Plan:  -New onset rectal bleeding.  Consider hemorrhoidal bleed versus diverticular bleed?  Family history of Crohn's disease noted.  Recommend evaluation with GI for probable colonoscopy.  Referral placed  -Will check CBC, CMP, iron panel.  He is hemodynamically stable  -If rectal bleeding becomes intractable or he develops significant dizziness or lightheadedness, he should be seen in the ER to consider expedited evaluation      1. Rectal bleeding  -     Ambulatory Referral to Gastroenterology; Future  -     CBC and differential; Future  -     Comprehensive metabolic panel; Future  -     Iron Panel (Includes Ferritin, Iron Sat%, Iron, and TIBC); Future             Orders Placed This Encounter   Procedures   • CBC and differential   • Comprehensive metabolic panel   • Ambulatory Referral to Gastroenterology       Chief Complaint     Chief  "Complaint   Patient presents with   • Rectal Bleeding     Experiencing blood in stool right lower abdomen burning sensation / pt refused tdap        Review of Systems     10 point ROS negative except per HPI    The following portions of the patient's history were reviewed and updated as appropriate: allergies, current medications, past family history, past medical history, past social history, past surgical history and problem list.    Objective     /80 (BP Location: Left arm, Patient Position: Sitting, Cuff Size: Standard)   Pulse 94   Temp (!) 97 °F (36.1 °C)   Resp 18   Ht 6' 1\" (1.854 m)   Wt 82.1 kg (181 lb)   SpO2 96%   BMI 23.88 kg/m²     Physical Exam  Abdominal:      General: Abdomen is flat. There is no distension.      Palpations: Abdomen is soft.      Tenderness: There is no abdominal tenderness. There is no guarding or rebound.           Current Medications   No current outpatient medications on file.      Jaspreet Rene D.O.  [facility] Internal Medicine - [address]  [place] [address]  Office: [phone]  Fax: [phone]      "

## 2024-08-31 ENCOUNTER — APPOINTMENT (EMERGENCY)
Dept: CT IMAGING | Facility: HOSPITAL | Age: 39
End: 2024-08-31
Payer: COMMERCIAL

## 2024-08-31 ENCOUNTER — HOSPITAL ENCOUNTER (EMERGENCY)
Facility: HOSPITAL | Age: 39
Discharge: HOME | End: 2024-08-31
Attending: EMERGENCY MEDICINE
Payer: COMMERCIAL

## 2024-08-31 VITALS
OXYGEN SATURATION: 97 % | DIASTOLIC BLOOD PRESSURE: 78 MMHG | BODY MASS INDEX: 24.03 KG/M2 | TEMPERATURE: 98.1 F | SYSTOLIC BLOOD PRESSURE: 171 MMHG | WEIGHT: 182.1 LBS | RESPIRATION RATE: 18 BRPM | HEART RATE: 86 BPM

## 2024-08-31 DIAGNOSIS — K92.2 LOWER GI BLEED: ICD-10-CM

## 2024-08-31 DIAGNOSIS — K62.5 RECTAL BLEEDING: Primary | ICD-10-CM

## 2024-08-31 LAB
ALBUMIN SERPL BCG-MCNC: 4.5 G/DL (ref 3.5–5)
ALP SERPL-CCNC: 44 U/L (ref 34–104)
ALT SERPL W P-5'-P-CCNC: 17 U/L (ref 7–52)
ANION GAP SERPL CALCULATED.3IONS-SCNC: 6 MMOL/L (ref 4–13)
APTT PPP: 28 SECONDS (ref 23–34)
AST SERPL W P-5'-P-CCNC: 20 U/L (ref 13–39)
BASOPHILS # BLD AUTO: 0.02 THOUSANDS/ÂΜL (ref 0–0.1)
BASOPHILS NFR BLD AUTO: 1 % (ref 0–1)
BILIRUB SERPL-MCNC: 0.53 MG/DL (ref 0.2–1)
BUN SERPL-MCNC: 17 MG/DL (ref 5–25)
CALCIUM SERPL-MCNC: 9.8 MG/DL (ref 8.4–10.2)
CHLORIDE SERPL-SCNC: 101 MMOL/L (ref 96–108)
CO2 SERPL-SCNC: 32 MMOL/L (ref 21–32)
CREAT SERPL-MCNC: 1.23 MG/DL (ref 0.6–1.3)
EOSINOPHIL # BLD AUTO: 0.08 THOUSAND/ÂΜL (ref 0–0.61)
EOSINOPHIL NFR BLD AUTO: 3 % (ref 0–6)
ERYTHROCYTE [DISTWIDTH] IN BLOOD BY AUTOMATED COUNT: 13.2 % (ref 11.6–15.1)
GFR SERPL CREATININE-BSD FRML MDRD: 72 ML/MIN/1.73SQ M
GLUCOSE SERPL-MCNC: 92 MG/DL (ref 65–140)
HCT VFR BLD AUTO: 43.8 % (ref 36.5–49.3)
HGB BLD-MCNC: 13.8 G/DL (ref 12–17)
IMM GRANULOCYTES # BLD AUTO: 0 THOUSAND/UL (ref 0–0.2)
IMM GRANULOCYTES NFR BLD AUTO: 0 % (ref 0–2)
INR PPP: 0.98 (ref 0.85–1.19)
LYMPHOCYTES # BLD AUTO: 1.65 THOUSANDS/ÂΜL (ref 0.6–4.47)
LYMPHOCYTES NFR BLD AUTO: 54 % (ref 14–44)
MCH RBC QN AUTO: 28.5 PG (ref 26.8–34.3)
MCHC RBC AUTO-ENTMCNC: 31.5 G/DL (ref 31.4–37.4)
MCV RBC AUTO: 90 FL (ref 82–98)
MONOCYTES # BLD AUTO: 0.3 THOUSAND/ÂΜL (ref 0.17–1.22)
MONOCYTES NFR BLD AUTO: 10 % (ref 4–12)
NEUTROPHILS # BLD AUTO: 0.95 THOUSANDS/ÂΜL (ref 1.85–7.62)
NEUTS SEG NFR BLD AUTO: 32 % (ref 43–75)
NRBC BLD AUTO-RTO: 0 /100 WBCS
PLATELET # BLD AUTO: 193 THOUSANDS/UL (ref 149–390)
PMV BLD AUTO: 10 FL (ref 8.9–12.7)
POTASSIUM SERPL-SCNC: 4.3 MMOL/L (ref 3.5–5.3)
PROT SERPL-MCNC: 7.6 G/DL (ref 6.4–8.4)
PROTHROMBIN TIME: 13.3 SECONDS (ref 12.3–15)
RBC # BLD AUTO: 4.85 MILLION/UL (ref 3.88–5.62)
SODIUM SERPL-SCNC: 139 MMOL/L (ref 135–147)
WBC # BLD AUTO: 3 THOUSAND/UL (ref 4.31–10.16)

## 2024-08-31 PROCEDURE — 85025 COMPLETE CBC W/AUTO DIFF WBC: CPT | Performed by: EMERGENCY MEDICINE

## 2024-08-31 PROCEDURE — 36415 COLL VENOUS BLD VENIPUNCTURE: CPT | Performed by: EMERGENCY MEDICINE

## 2024-08-31 PROCEDURE — 99285 EMERGENCY DEPT VISIT HI MDM: CPT

## 2024-08-31 PROCEDURE — 85730 THROMBOPLASTIN TIME PARTIAL: CPT | Performed by: EMERGENCY MEDICINE

## 2024-08-31 PROCEDURE — 74177 CT ABD & PELVIS W/CONTRAST: CPT

## 2024-08-31 PROCEDURE — 99284 EMERGENCY DEPT VISIT MOD MDM: CPT | Performed by: EMERGENCY MEDICINE

## 2024-08-31 PROCEDURE — 80053 COMPREHEN METABOLIC PANEL: CPT | Performed by: EMERGENCY MEDICINE

## 2024-08-31 PROCEDURE — 96360 HYDRATION IV INFUSION INIT: CPT

## 2024-08-31 PROCEDURE — 85610 PROTHROMBIN TIME: CPT | Performed by: EMERGENCY MEDICINE

## 2024-08-31 RX ADMIN — SODIUM CHLORIDE 1000 ML: 0.9 INJECTION, SOLUTION INTRAVENOUS at 07:18

## 2024-08-31 RX ADMIN — IOHEXOL 100 ML: 350 INJECTION, SOLUTION INTRAVENOUS at 08:25

## 2024-08-31 NOTE — Clinical Note
Pio Jacobo was seen and treated in our emergency department on 8/31/2024.                Diagnosis:     Pio  .    He may return on this date: 09/02/2024         If you have any questions or concerns, please don't hesitate to call.      Venancio Cole MD    ______________________________           _______________          _______________  Hospital Representative                              Date                                Time

## 2024-08-31 NOTE — ED PROVIDER NOTES
History  Chief Complaint   Patient presents with    Rectal Bleeding     Patient c/o bright red blood with Bms for about a week     Patient presents for rectal bleeding for over 1 week he saw his doctor a few days ago who did a physical exam excluding the rectal exam and referred him to GI told him to come back if the bleeding go worse patient said he is having bloody stools some blood when he wipes is gotten little worse however he is not having any pain feeling lightheaded short of breath no palpitations no chest pain no abdominal pain with denies any bleeding disorders or any bleeding disorders in the family denies taking any aspirin or Motrin or any other anticoagulants mom was recently diagnosed with ulcerative colitis denies melena patient denies any travel outside the country          None       History reviewed. No pertinent past medical history.    Past Surgical History:   Procedure Laterality Date    SURGERY SCROTAL / TESTICULAR Bilateral 2017       Family History   Problem Relation Age of Onset    Chronic bronchitis Mother      I have reviewed and agree with the history as documented.    E-Cigarette/Vaping    E-Cigarette Use Never User      E-Cigarette/Vaping Substances     Social History     Tobacco Use    Smoking status: Never    Smokeless tobacco: Never   Vaping Use    Vaping status: Never Used   Substance Use Topics    Alcohol use: Yes     Alcohol/week: 4.0 standard drinks of alcohol     Types: 1 Glasses of wine, 2 Cans of beer, 1 Shots of liquor per week     Comment: socially    Drug use: Yes     Types: Marijuana     Comment: occationally       Review of Systems   Constitutional:  Negative for chills and fever.   Eyes:  Negative for pain.   Respiratory:  Negative for shortness of breath.    Cardiovascular:  Negative for chest pain and palpitations.   Gastrointestinal:  Positive for blood in stool. Negative for abdominal pain, diarrhea and vomiting.   Genitourinary:  Negative for dysuria.    Musculoskeletal:  Negative for arthralgias and back pain.   Skin:  Negative for color change and rash.   Neurological:  Negative for dizziness, seizures, syncope and weakness.   All other systems reviewed and are negative.      Physical Exam  Physical Exam  Vitals and nursing note reviewed.   Constitutional:       General: He is not in acute distress.     Appearance: He is well-developed.   HENT:      Head: Normocephalic and atraumatic.   Eyes:      Conjunctiva/sclera: Conjunctivae normal.   Cardiovascular:      Rate and Rhythm: Normal rate and regular rhythm.      Heart sounds: No murmur heard.  Pulmonary:      Effort: Pulmonary effort is normal. No respiratory distress.      Breath sounds: Normal breath sounds.   Abdominal:      General: Abdomen is flat. Bowel sounds are normal. There is no distension.      Palpations: Abdomen is soft. There is no mass.      Tenderness: There is no abdominal tenderness. There is no right CVA tenderness, left CVA tenderness, guarding or rebound.      Hernia: No hernia is present.   Genitourinary:     Comments: Rectal exam no external hemorrhoid no fissure rectal exam no masses felt brown stool heme-negative  Musculoskeletal:         General: No swelling.      Cervical back: Neck supple.   Skin:     General: Skin is warm and dry.      Capillary Refill: Capillary refill takes less than 2 seconds.      Coloration: Skin is not jaundiced.   Neurological:      General: No focal deficit present.      Mental Status: He is alert.   Psychiatric:         Mood and Affect: Mood normal.         Vital Signs  ED Triage Vitals [08/31/24 0711]   Temperature Pulse Respirations Blood Pressure SpO2   98.1 °F (36.7 °C) 86 18 (!) 171/78 97 %      Temp Source Heart Rate Source Patient Position - Orthostatic VS BP Location FiO2 (%)   Oral Monitor Sitting Left arm --      Pain Score       --           Vitals:    08/31/24 0711   BP: (!) 171/78   Pulse: 86   Patient Position - Orthostatic VS: Sitting          Visual Acuity      ED Medications  Medications   sodium chloride 0.9 % bolus 1,000 mL (0 mL Intravenous Stopped 8/31/24 0835)   iohexol (OMNIPAQUE) 350 MG/ML injection (MULTI-DOSE) 100 mL (100 mL Intravenous Given 8/31/24 0825)       Diagnostic Studies  Results Reviewed       Procedure Component Value Units Date/Time    Calprotectin,Fecal [[id number]]     Lab Status: No result Specimen: Stool     Giardia lamblia, EIA and Ova and Parasites Examination [[id number]]     Lab Status: No result Specimen: Stool     Clostridium difficile toxin by PCR with EIA [[id number]]     Lab Status: No result Specimen: Stool     Protime-INR [[id number]]  (Normal) Collected: 08/31/24 0717    Lab Status: Final result Specimen: Blood from Arm, Right Updated: 08/31/24 0808     Protime 13.3 seconds      INR 0.98    Narrative:      INR Therapeutic Range    Indication                                             INR Range      Atrial Fibrillation                                               2.0-3.0  Hypercoagulable State                                    2.0.2.3  Left Ventricular Asist Device                            2.0-3.0  Mechanical Heart Valve                                  -    Aortic(with afib, MI, embolism, HF, LA enlargement,    and/or coagulopathy)                                     2.0-3.0 (2.5-3.5)     Mitral                                                             2.5-3.5  Prosthetic/Bioprosthetic Heart Valve               2.0-3.0  Venous thromboembolism (VTE: VT, PE        2.0-3.0    APTT [[id number]]  (Normal) Collected: 08/31/24 0717    Lab Status: Final result Specimen: Blood from Arm, Right Updated: 08/31/24 0808     PTT 28 seconds     Comprehensive metabolic panel [[id number]] Collected: 08/31/24 0717    Lab Status: Final result Specimen: Blood from Arm, Right Updated: 08/31/24 0756     Sodium 139 mmol/L      Potassium 4.3 mmol/L      Chloride 101 mmol/L      CO2 32 mmol/L      ANION GAP 6 mmol/L      BUN 17  mg/dL      Creatinine 1.23 mg/dL      Glucose 92 mg/dL      Calcium 9.8 mg/dL      AST 20 U/L      ALT 17 U/L      Alkaline Phosphatase 44 U/L      Total Protein 7.6 g/dL      Albumin 4.5 g/dL      Total Bilirubin 0.53 mg/dL      eGFR 72 ml/min/1.73sq m     Narrative:      National Kid[employer] Foundation guidelines for Chronic Kidney Disease (CKD):     Stage 1 with normal or high GFR (GFR > 90 mL/min/1.73 square meters)    Stage 2 Mild CKD (GFR = 60-89 mL/min/1.73 square meters)    Stage 3A Moderate CKD (GFR = 45-59 mL/min/1.73 square meters)    Stage 3B Moderate CKD (GFR = 30-44 mL/min/1.73 square meters)    Stage 4 Severe CKD (GFR = 15-29 mL/min/1.73 square meters)    Stage 5 End Stage CKD (GFR <15 mL/min/1.73 square meters)  Note: GFR calculation is accurate only with a steady state creatinine    CBC and differential [[id number]]  (Abnormal) Collected: 08/31/24 0717    Lab Status: Final result Specimen: Blood from Arm, Right Updated: 08/31/24 0733     WBC 3.00 Thousand/uL      RBC 4.85 Million/uL      Hemoglobin 13.8 g/dL      Hematocrit 43.8 %      MCV 90 fL      MCH 28.5 pg      MCHC 31.5 g/dL      RDW 13.2 %      MPV 10.0 fL      Platelets 193 Thousands/uL      nRBC 0 /100 WBCs      Segmented % 32 %      Immature Grans % 0 %      Lymphocytes % 54 %      Monocytes % 10 %      Eosinophils Relative 3 %      Basophils Relative 1 %      Absolute Neutrophils 0.95 Thousands/µL      Absolute Immature Grans 0.00 Thousand/uL      Absolute Lymphocytes 1.65 Thousands/µL      Absolute Monocytes 0.30 Thousand/µL      Eosinophils Absolute 0.08 Thousand/µL      Basophils Absolute 0.02 Thousands/µL                    CT abdomen pelvis with contrast   Final Result by Eben Lowery MD (08/31 0856)      No acute abnormality identified in the abdomen or pelvis.         Workstation performed: [id number]                    Procedures  Procedures         ED Course                                 SBIRT 22yo+      Flowsheet  Row Most Recent Value   Initial Alcohol Screen: US AUDIT-C     1. How often do you have a drink containing alcohol? 0 Filed at: 08/31/2024 0712   2. How many drinks containing alcohol do you have on a typical day you are drinking?  0 Filed at: 08/31/2024 0712   3a. Male UNDER 65: How often do you have five or more drinks on one occasion? 0 Filed at: 08/31/2024 0712   3b. FEMALE Any Age, or MALE 65+: How often do you have 4 or more drinks on one occassion? 0 Filed at: 08/31/2024 0712   Audit-C Score 0 Filed at: 08/31/2024 0712   TRUE: How many times in the past year have you...    Used an illegal drug or used a prescription medication for non-medical reasons? Never Filed at: 08/31/2024 0712                      Medical Decision Making  With rectal bleeding for over a week etiology unclear patient is hemodynamically stable asymptomatic workup was negative hemoglobin was over 13 CT was negative electrolytes LFTs unremarkable PT PTT unremarkable Case was discussed with  Gi patient does have a follow-up ointment on Friday patient will need a colonoscopy stool studies were added he does not recommend starting on any steroids or any antibiotics at this time patient is given strict instructions return if increasing bleeding feeling lightheaded short of breath new problems or concerns    Amount and/or Complexity of Data Reviewed  Labs: ordered.  Radiology: ordered.    Risk  Prescription drug management.                 Disposition  Final diagnoses:   Rectal bleeding   Lower GI bleed     Time reflects when diagnosis was documented in both MDM as applicable and the Disposition within this note       Time User Action Codes Description Comment    8/31/2024  9:30 AM Venancio Cole Add [K62.5] Rectal bleeding     8/31/2024  9:30 AM Venancio Cole Add [K92.2] Lower GI bleed           ED Disposition       ED Disposition   Discharge    Condition   Stable    Date/Time   Sat Aug 31, 2024 4730    Comment   Pio Jacobo discharge to  home/self care.                   Follow-up Information       Follow up With Specialties Details Why Contact Info Additional Information    Jaspreet Rene, DO Internal Medicine In 3 days  [address]  [address]  [facility] [address] [phone]       [facility] [place] [facility] In 3 days  [address]  [address]  [facility] [address] [phone] [facility] [place], [address], [place], [place], [address] [phone]            There are no discharge medications for this patient.      No discharge procedures on file.    PDMP Review       None            ED Provider  Electronically Signed by             Venancio Cole MD  08/31/24 3146

## 2024-09-06 ENCOUNTER — OFFICE VISIT (OUTPATIENT)
Dept: GASTROENTEROLOGY | Facility: CLINIC | Age: 39
End: 2024-09-06
Payer: COMMERCIAL

## 2024-09-06 VITALS
WEIGHT: 182 LBS | OXYGEN SATURATION: 98 % | HEIGHT: 73 IN | TEMPERATURE: 97.1 F | HEART RATE: 76 BPM | SYSTOLIC BLOOD PRESSURE: 110 MMHG | BODY MASS INDEX: 24.12 KG/M2 | DIASTOLIC BLOOD PRESSURE: 74 MMHG

## 2024-09-06 DIAGNOSIS — K62.5 RECTAL BLEEDING: ICD-10-CM

## 2024-09-06 PROCEDURE — 99204 OFFICE O/P NEW MOD 45 MIN: CPT | Performed by: DIETITIAN, REGISTERED

## 2024-09-06 NOTE — PROGRESS NOTES
[facility] - Outpatient Consultation New Patient  Pio Jacobo 39 y.o. male MRN: [id number]  Encounter: [id number]          ASSESSMENT AND PLAN:    1.  Rectal bleeding  Patient reports 2-week history of BRBPR with visible clots, seen in the toilet bowl water.  He denies any significant abdominal pain, changes in bowel habits, or rectal pain.  He sometimes has burning in the RLQ.  He denies any constipation or diarrhea and endorses very regular bowel habits.  He denies any weight loss.  He denies any shortness of breath or dizziness.  He has headaches on days that he does not drink coffee.  No prior colonoscopy.  His mother has a history of Crohn's colitis.      -Follow-up stool studies as ordered at the ED (giardia, C. difficile, and fecal calprotectin).  -Follow-up iron panel as ordered by PCP.  Also check CRP and sed rate.  -Recommend colonoscopy with GoLytely bowel prep.  -Advised patient to call the office or send a [facility] message with any questions/concerns or any new/worsening symptoms.    I discussed informed consent with the patient. The risks/benefits/alternatives of the procedure were discussed with the patient. Risks included, but not limited to, infection, bleeding, perforation, injury to organs in the abdomen, missed lesion and incomplete procedure were discussed. Patient was agreeable.      Follow up in office after colonoscopy.    ________________________________________________________    HPI:  Pio Jacobo is a 39 y.o. male who presents for evaluation of rectal bleeding.  Patient was seen at the ED on 8/31/2024 for bright red rectal bleeding x 1 week.  Rectal exam was negative for external hemorrhoids, anal fissure, and stool tested heme-negative.  Stool tests including fecal calprotectin, Giardia, and C. difficile were ordered but not yet collected.  CBC noted white blood cells 3.0, 32% segmented, 54% lymphocytes, and 0.95 absolute neutrophils.  CMP normal.    Patient  reports 2-week history of BRBPR with visible clots, seen in the toilet bowl water.  He denies any significant abdominal pain, changes in bowel habits, or rectal pain.  He sometimes has burning in the RLQ.  He denies any constipation or diarrhea and endorses very regular bowel habits.  He denies any weight loss.  He denies any shortness of breath or dizziness.  He has headaches on days that he does not drink coffee.  No prior colonoscopy.  His mother has a history of Crohn's colitis.          Answers submitted by the patient for this visit:  Abdominal Pain Questionnaire (Submitted on 8/30/2024)  Chief Complaint: Abdominal pain  Chronicity: recurrent  Onset: in the past 7 days  Onset quality: undetermined  Frequency: rarely  Episode duration: 6 Hours  Progression since onset: waxing and waning  Pain location: right flank  Pain - numeric: 2/10  Pain quality: burning, dull  Radiates to: does not radiate  flatus: Yes  hematochezia: Yes  nausea: Yes  Aggravated by: nothing  Relieved by: nothing      REVIEW OF SYSTEMS:  10 point ROS reviewed and negative, except as above    Historical Information   History reviewed. No pertinent past medical history.  Past Surgical History:   Procedure Laterality Date   • SURGERY SCROTAL / TESTICULAR Bilateral 2017     Social History   Social History     Substance and Sexual Activity   Alcohol Use Yes   • Alcohol/week: 4.0 standard drinks of alcohol   • Types: 1 Glasses of wine, 2 Cans of beer, 1 Shots of liquor per week    Comment: socially     Social History     Substance and Sexual Activity   Drug Use Yes   • Types: Marijuana    Comment: occationally     Social History     Tobacco Use   Smoking Status Never   Smokeless Tobacco Never     Family History   Problem Relation Age of Onset   • Chronic bronchitis Mother        Meds/Allergies       Current Outpatient Medications:   •  polyethylene glycol (GOLYTELY) 4000 mL solution    Allergies   Allergen Reactions   • Shellfish Allergy - Food  Allergy Throat Swelling and Lip Swelling           Objective   Wt Readings from Last 3 Encounters:   09/06/24 82.6 kg (182 lb)   08/31/24 82.6 kg (182 lb 1.6 oz)   08/28/24 82.1 kg (181 lb)     Temp Readings from Last 3 Encounters:   09/06/24 (!) 97.1 °F (36.2 °C) (Tympanic)   08/31/24 98.1 °F (36.7 °C) (Oral)   08/28/24 (!) 97 °F (36.1 °C)     BP Readings from Last 3 Encounters:   09/06/24 110/74   08/31/24 (!) 171/78   08/28/24 120/80     Pulse Readings from Last 3 Encounters:   09/06/24 76   08/31/24 86   08/28/24 94        PHYSICAL EXAM:     Physical Exam        Lab Results:   No visits with results within 1 Day(s) from this visit.   Latest known visit with results is:   Admission on 08/31/2024, Discharged on 08/31/2024   Component Date Value   • WBC 08/31/2024 3.00 (L)    • RBC 08/31/2024 4.85    • Hemoglobin 08/31/2024 13.8    • Hematocrit 08/31/2024 43.8    • MCV 08/31/2024 90    • MCH 08/31/2024 28.5    • MCHC 08/31/2024 31.5    • RDW 08/31/2024 13.2    • MPV 08/31/2024 10.0    • Platelets 08/31/2024 193    • nRBC 08/31/2024 0    • Segmented % 08/31/2024 32 (L)    • Immature Grans % 08/31/2024 0    • Lymphocytes % 08/31/2024 54 (H)    • Monocytes % 08/31/2024 10    • Eosinophils Relative 08/31/2024 3    • Basophils Relative 08/31/2024 1    • Absolute Neutrophils 08/31/2024 0.95 (L)    • Absolute Immature Grans 08/31/2024 0.00    • Absolute Lymphocytes 08/31/2024 1.65    • Absolute Monocytes 08/31/2024 0.30    • Eosinophils Absolute 08/31/2024 0.08    • Basophils Absolute 08/31/2024 0.02    • Sodium 08/31/2024 139    • Potassium 08/31/2024 4.3    • Chloride 08/31/2024 101    • CO2 08/31/2024 32    • ANION GAP 08/31/2024 6    • BUN 08/31/2024 17    • Creatinine 08/31/2024 1.23    • Glucose 08/31/2024 92    • Calcium 08/31/2024 9.8    • AST 08/31/2024 20    • ALT 08/31/2024 17    • Alkaline Phosphatase 08/31/2024 44    • Total Protein 08/31/2024 7.6    • Albumin 08/31/2024 4.5    • Total Bilirubin 08/31/2024  0.53    • eGFR 08/31/2024 72    • Protime 08/31/2024 13.3    • INR 08/31/2024 0.98    • PTT 08/31/2024 28        Lab Results   Component Value Date    WBC 3.00 (L) 08/31/2024    HGB 13.8 08/31/2024    HCT 43.8 08/31/2024    MCV 90 08/31/2024     08/31/2024       Lab Results   Component Value Date    SODIUM 139 08/31/2024    K 4.3 08/31/2024     08/31/2024    CO2 32 08/31/2024    AGAP 6 08/31/2024    BUN 17 08/31/2024    CREATININE 1.23 08/31/2024    GLUC 92 08/31/2024    GLUF 91 06/15/2024    CALCIUM 9.8 08/31/2024    AST 20 08/31/2024    ALT 17 08/31/2024    ALKPHOS 44 08/31/2024    TP 7.6 08/31/2024    TBILI 0.53 08/31/2024    EGFR 72 08/31/2024         Radiology Results:   CT abdomen pelvis with contrast    Result Date: 8/31/2024  Narrative: CT ABDOMEN AND PELVIS WITH IV CONTRAST INDICATION: rectal bleeding. . COMPARISON: None. TECHNIQUE: CT examination of the abdomen and pelvis was performed. Multiplanar 2D reformatted images were created from the source data. This examination, like all CT scans performed in the [facility], was performed utilizing techniques to minimize radiation dose exposure, including the use of iterative reconstruction and automated exposure control. Radiation dose length product (DLP) for this visit: 468 mGy-cm IV Contrast: 100 mL of iohexol (OMNIPAQUE) Enteric Contrast: Not administered. FINDINGS: ABDOMEN LOWER CHEST: No clinically significant abnormality in the visualized lower chest. LIVER/BILIARY TREE: Unremarkable. GALLBLADDER: No calcified gallstones. No pericholecystic inflammatory change. SPLEEN: Unremarkable. PANCREAS: Unremarkable. ADRENAL GLANDS: Unremarkable. KIDNEYS/URETERS: Unremarkable. No hydronephrosis. STOMACH AND BOWEL: Moderate retained fecal material in the colon and rectum. No bowel wall thickening or intestinal obstruction is appreciated. APPENDIX: Normal. ABDOMINOPELVIC CAVITY: No ascites. No pneumoperitoneum. No lymphadenopathy.  VESSELS: Unremarkable for patient's age. PELVIS REPRODUCTIVE ORGANS: Unremarkable for patient's age. URINARY BLADDER: Unremarkable. ABDOMINAL WALL/INGUINAL REGIONS: Small wide neck umbilical hernia containing fat and a knuckle of small bowel without evidence for strangulation or obstruction. BONES: No acute fracture or suspicious osseous lesion.     Impression: No acute abnormality identified in the abdomen or pelvis. Workstation performed: [id number]

## 2024-09-06 NOTE — H&P (VIEW-ONLY)
[facility] - Outpatient Consultation New Patient  Pio Jacobo 39 y.o. male MRN: [id number]  Encounter: [id number]          ASSESSMENT AND PLAN:    1.  Rectal bleeding  Patient reports 2-week history of BRBPR with visible clots, seen in the toilet bowl water.  He denies any significant abdominal pain, changes in bowel habits, or rectal pain.  He sometimes has burning in the RLQ.  He denies any constipation or diarrhea and endorses very regular bowel habits.  He denies any weight loss.  He denies any shortness of breath or dizziness.  He has headaches on days that he does not drink coffee.  No prior colonoscopy.  His mother has a history of Crohn's colitis.      -Follow-up stool studies as ordered at the ED (giardia, C. difficile, and fecal calprotectin).  -Follow-up iron panel as ordered by PCP.  Also check CRP and sed rate.  -Recommend colonoscopy with GoLytely bowel prep.  -Advised patient to call the office or send a Myrio message with any questions/concerns or any new/worsening symptoms.    I discussed informed consent with the patient. The risks/benefits/alternatives of the procedure were discussed with the patient. Risks included, but not limited to, infection, bleeding, perforation, injury to organs in the abdomen, missed lesion and incomplete procedure were discussed. Patient was agreeable.      Follow up in office after colonoscopy.    ________________________________________________________    HPI:  Pio Jacobo is a 39 y.o. male who presents for evaluation of rectal bleeding.  Patient was seen at the ED on 8/31/2024 for bright red rectal bleeding x 1 week.  Rectal exam was negative for external hemorrhoids, anal fissure, and stool tested heme-negative.  Stool tests including fecal calprotectin, Giardia, and C. difficile were ordered but not yet collected.  CBC noted white blood cells 3.0, 32% segmented, 54% lymphocytes, and 0.95 absolute neutrophils.  CMP normal.    Patient  reports 2-week history of BRBPR with visible clots, seen in the toilet bowl water.  He denies any significant abdominal pain, changes in bowel habits, or rectal pain.  He sometimes has burning in the RLQ.  He denies any constipation or diarrhea and endorses very regular bowel habits.  He denies any weight loss.  He denies any shortness of breath or dizziness.  He has headaches on days that he does not drink coffee.  No prior colonoscopy.  His mother has a history of Crohn's colitis.          Answers submitted by the patient for this visit:  Abdominal Pain Questionnaire (Submitted on 8/30/2024)  Chief Complaint: Abdominal pain  Chronicity: recurrent  Onset: in the past 7 days  Onset quality: undetermined  Frequency: rarely  Episode duration: 6 Hours  Progression since onset: waxing and waning  Pain location: right flank  Pain - numeric: 2/10  Pain quality: burning, dull  Radiates to: does not radiate  flatus: Yes  hematochezia: Yes  nausea: Yes  Aggravated by: nothing  Relieved by: nothing      REVIEW OF SYSTEMS:  10 point ROS reviewed and negative, except as above    Historical Information   History reviewed. No pertinent past medical history.  Past Surgical History:   Procedure Laterality Date   • SURGERY SCROTAL / TESTICULAR Bilateral 2017     Social History   Social History     Substance and Sexual Activity   Alcohol Use Yes   • Alcohol/week: 4.0 standard drinks of alcohol   • Types: 1 Glasses of wine, 2 Cans of beer, 1 Shots of liquor per week    Comment: socially     Social History     Substance and Sexual Activity   Drug Use Yes   • Types: Marijuana    Comment: occationally     Social History     Tobacco Use   Smoking Status Never   Smokeless Tobacco Never     Family History   Problem Relation Age of Onset   • Chronic bronchitis Mother        Meds/Allergies       Current Outpatient Medications:   •  polyethylene glycol (GOLYTELY) 4000 mL solution    Allergies   Allergen Reactions   • Shellfish Allergy - Food  Allergy Throat Swelling and Lip Swelling           Objective   Wt Readings from Last 3 Encounters:   09/06/24 82.6 kg (182 lb)   08/31/24 82.6 kg (182 lb 1.6 oz)   08/28/24 82.1 kg (181 lb)     Temp Readings from Last 3 Encounters:   09/06/24 (!) 97.1 °F (36.2 °C) (Tympanic)   08/31/24 98.1 °F (36.7 °C) (Oral)   08/28/24 (!) 97 °F (36.1 °C)     BP Readings from Last 3 Encounters:   09/06/24 110/74   08/31/24 (!) 171/78   08/28/24 120/80     Pulse Readings from Last 3 Encounters:   09/06/24 76   08/31/24 86   08/28/24 94        PHYSICAL EXAM:     Physical Exam        Lab Results:   No visits with results within 1 Day(s) from this visit.   Latest known visit with results is:   Admission on 08/31/2024, Discharged on 08/31/2024   Component Date Value   • WBC 08/31/2024 3.00 (L)    • RBC 08/31/2024 4.85    • Hemoglobin 08/31/2024 13.8    • Hematocrit 08/31/2024 43.8    • MCV 08/31/2024 90    • MCH 08/31/2024 28.5    • MCHC 08/31/2024 31.5    • RDW 08/31/2024 13.2    • MPV 08/31/2024 10.0    • Platelets 08/31/2024 193    • nRBC 08/31/2024 0    • Segmented % 08/31/2024 32 (L)    • Immature Grans % 08/31/2024 0    • Lymphocytes % 08/31/2024 54 (H)    • Monocytes % 08/31/2024 10    • Eosinophils Relative 08/31/2024 3    • Basophils Relative 08/31/2024 1    • Absolute Neutrophils 08/31/2024 0.95 (L)    • Absolute Immature Grans 08/31/2024 0.00    • Absolute Lymphocytes 08/31/2024 1.65    • Absolute Monocytes 08/31/2024 0.30    • Eosinophils Absolute 08/31/2024 0.08    • Basophils Absolute 08/31/2024 0.02    • Sodium 08/31/2024 139    • Potassium 08/31/2024 4.3    • Chloride 08/31/2024 101    • CO2 08/31/2024 32    • ANION GAP 08/31/2024 6    • BUN 08/31/2024 17    • Creatinine 08/31/2024 1.23    • Glucose 08/31/2024 92    • Calcium 08/31/2024 9.8    • AST 08/31/2024 20    • ALT 08/31/2024 17    • Alkaline Phosphatase 08/31/2024 44    • Total Protein 08/31/2024 7.6    • Albumin 08/31/2024 4.5    • Total Bilirubin 08/31/2024  0.53    • eGFR 08/31/2024 72    • Protime 08/31/2024 13.3    • INR 08/31/2024 0.98    • PTT 08/31/2024 28        Lab Results   Component Value Date    WBC 3.00 (L) 08/31/2024    HGB 13.8 08/31/2024    HCT 43.8 08/31/2024    MCV 90 08/31/2024     08/31/2024       Lab Results   Component Value Date    SODIUM 139 08/31/2024    K 4.3 08/31/2024     08/31/2024    CO2 32 08/31/2024    AGAP 6 08/31/2024    BUN 17 08/31/2024    CREATININE 1.23 08/31/2024    GLUC 92 08/31/2024    GLUF 91 06/15/2024    CALCIUM 9.8 08/31/2024    AST 20 08/31/2024    ALT 17 08/31/2024    ALKPHOS 44 08/31/2024    TP 7.6 08/31/2024    TBILI 0.53 08/31/2024    EGFR 72 08/31/2024         Radiology Results:   CT abdomen pelvis with contrast    Result Date: 8/31/2024  Narrative: CT ABDOMEN AND PELVIS WITH IV CONTRAST INDICATION: rectal bleeding. . COMPARISON: None. TECHNIQUE: CT examination of the abdomen and pelvis was performed. Multiplanar 2D reformatted images were created from the source data. This examination, like all CT scans performed in the [facility], was performed utilizing techniques to minimize radiation dose exposure, including the use of iterative reconstruction and automated exposure control. Radiation dose length product (DLP) for this visit: 468 mGy-cm IV Contrast: 100 mL of iohexol (OMNIPAQUE) Enteric Contrast: Not administered. FINDINGS: ABDOMEN LOWER CHEST: No clinically significant abnormality in the visualized lower chest. LIVER/BILIARY TREE: Unremarkable. GALLBLADDER: No calcified gallstones. No pericholecystic inflammatory change. SPLEEN: Unremarkable. PANCREAS: Unremarkable. ADRENAL GLANDS: Unremarkable. KIDNEYS/URETERS: Unremarkable. No hydronephrosis. STOMACH AND BOWEL: Moderate retained fecal material in the colon and rectum. No bowel wall thickening or intestinal obstruction is appreciated. APPENDIX: Normal. ABDOMINOPELVIC CAVITY: No ascites. No pneumoperitoneum. No lymphadenopathy.  VESSELS: Unremarkable for patient's age. PELVIS REPRODUCTIVE ORGANS: Unremarkable for patient's age. URINARY BLADDER: Unremarkable. ABDOMINAL WALL/INGUINAL REGIONS: Small wide neck umbilical hernia containing fat and a knuckle of small bowel without evidence for strangulation or obstruction. BONES: No acute fracture or suspicious osseous lesion.     Impression: No acute abnormality identified in the abdomen or pelvis. Workstation performed: [id number]

## 2024-09-11 ENCOUNTER — ANESTHESIA (OUTPATIENT)
Dept: ANESTHESIOLOGY | Facility: HOSPITAL | Age: 39
End: 2024-09-11

## 2024-09-11 ENCOUNTER — ANESTHESIA EVENT (OUTPATIENT)
Dept: ANESTHESIOLOGY | Facility: HOSPITAL | Age: 39
End: 2024-09-11

## 2024-09-11 ENCOUNTER — TELEPHONE (OUTPATIENT)
Dept: GASTROENTEROLOGY | Facility: CLINIC | Age: 39
End: 2024-09-11

## 2024-09-11 NOTE — TELEPHONE ENCOUNTER
Confirming Upcoming Procedure: colonoscopy on 09/18/2024  Physician performing: Dr Daily  Location of procedure:  west end   Prep: Golytely prep     No question about prep at the moment

## 2024-09-18 ENCOUNTER — ANESTHESIA EVENT (OUTPATIENT)
Dept: GASTROENTEROLOGY | Facility: MEDICAL CENTER | Age: 39
End: 2024-09-18
Payer: COMMERCIAL

## 2024-09-18 ENCOUNTER — ANESTHESIA (OUTPATIENT)
Dept: GASTROENTEROLOGY | Facility: MEDICAL CENTER | Age: 39
End: 2024-09-18
Payer: COMMERCIAL

## 2024-09-18 ENCOUNTER — HOSPITAL ENCOUNTER (OUTPATIENT)
Dept: GASTROENTEROLOGY | Facility: MEDICAL CENTER | Age: 39
Setting detail: OUTPATIENT SURGERY
Discharge: HOME | End: 2024-09-18
Payer: COMMERCIAL

## 2024-09-18 VITALS
DIASTOLIC BLOOD PRESSURE: 58 MMHG | TEMPERATURE: 97.2 F | HEART RATE: 60 BPM | BODY MASS INDEX: 24.65 KG/M2 | RESPIRATION RATE: 20 BRPM | SYSTOLIC BLOOD PRESSURE: 114 MMHG | WEIGHT: 182 LBS | HEIGHT: 72 IN | OXYGEN SATURATION: 100 %

## 2024-09-18 DIAGNOSIS — K62.5 RECTAL BLEEDING: ICD-10-CM

## 2024-09-18 PROCEDURE — 45380 COLONOSCOPY AND BIOPSY: CPT | Performed by: INTERNAL MEDICINE

## 2024-09-18 PROCEDURE — 88305 TISSUE EXAM BY PATHOLOGIST: CPT | Performed by: SPECIALIST

## 2024-09-18 RX ORDER — PROPOFOL 10 MG/ML
INJECTION, EMULSION INTRAVENOUS AS NEEDED
Status: DISCONTINUED | OUTPATIENT
Start: 2024-09-18 | End: 2024-09-18

## 2024-09-18 RX ORDER — SODIUM CHLORIDE 9 MG/ML
125 INJECTION, SOLUTION INTRAVENOUS CONTINUOUS
Status: CANCELLED | OUTPATIENT
Start: 2024-09-18

## 2024-09-18 RX ORDER — SODIUM CHLORIDE 9 MG/ML
125 INJECTION, SOLUTION INTRAVENOUS CONTINUOUS
Status: DISCONTINUED | OUTPATIENT
Start: 2024-09-18 | End: 2024-09-22 | Stop reason: HOSPADM

## 2024-09-18 RX ADMIN — PROPOFOL 50 MG: 10 INJECTION, EMULSION INTRAVENOUS at 13:49

## 2024-09-18 RX ADMIN — PROPOFOL 120 MG: 10 INJECTION, EMULSION INTRAVENOUS at 13:36

## 2024-09-18 RX ADMIN — Medication 40 MG: at 13:41

## 2024-09-18 RX ADMIN — SODIUM CHLORIDE 125 ML/HR: 0.9 INJECTION, SOLUTION INTRAVENOUS at 13:19

## 2024-09-18 RX ADMIN — PROPOFOL 50 MG: 10 INJECTION, EMULSION INTRAVENOUS at 13:55

## 2024-09-18 RX ADMIN — PROPOFOL 50 MG: 10 INJECTION, EMULSION INTRAVENOUS at 13:39

## 2024-09-18 RX ADMIN — PROPOFOL 50 MG: 10 INJECTION, EMULSION INTRAVENOUS at 13:43

## 2024-09-18 RX ADMIN — PROPOFOL 50 MG: 10 INJECTION, EMULSION INTRAVENOUS at 13:46

## 2024-09-18 RX ADMIN — PROPOFOL 50 MG: 10 INJECTION, EMULSION INTRAVENOUS at 13:52

## 2024-09-18 NOTE — ANESTHESIA POSTPROCEDURE EVALUATION
Post-Op Assessment Note    CV Status:  Stable  Pain Score: 0    Pain management: adequate       Mental Status:  Alert and awake   Hydration Status:  Euvolemic   PONV Controlled:  Controlled   Airway Patency:  Patent     Post Op Vitals Reviewed: Yes    No anethesia notable event occurred.    Staff: Anesthesiologist, CRNA               BP      Temp      Pulse 79 (09/18/24 1401)   Resp 16 (09/18/24 1401)    SpO2 97 % (09/18/24 1401)

## 2024-09-18 NOTE — ANESTHESIA PREPROCEDURE EVALUATION
Procedure:  COLONOSCOPY    Relevant Problems   ANESTHESIA (within normal limits)      CARDIO (within normal limits)      GI/HEPATIC  Rectal bleeding      HEMATOLOGY (within normal limits)      PULMONARY (within normal limits)        Physical Exam    Airway    Mallampati score: I  TM Distance: >3 FB  Neck ROM: full     Dental   No notable dental hx     Cardiovascular  Cardiovascular exam normal    Pulmonary  Pulmonary exam normal     Other Findings        Anesthesia Plan  ASA Score- 1     Anesthesia Type- IV sedation with anesthesia with ASA Monitors.         Additional Monitors:     Airway Plan:            Plan Factors-    Chart reviewed.    Patient summary reviewed.                  Induction- intravenous.    Postoperative Plan-         Informed Consent- Anesthetic plan and risks discussed with patient.

## 2024-09-18 NOTE — INTERVAL H&P NOTE
H&P reviewed. After examining the patient I find no changes in the patients condition since the H&P had been written.    Vitals:    09/18/24 1316   BP: 125/78   Pulse: 64   Resp: 16   Temp: (!) 97.2 °F (36.2 °C)   SpO2: 99%

## 2024-09-23 PROCEDURE — 88305 TISSUE EXAM BY PATHOLOGIST: CPT | Performed by: SPECIALIST

## 2025-06-19 ENCOUNTER — OFFICE VISIT (OUTPATIENT)
Dept: INTERNAL MEDICINE CLINIC | Facility: CLINIC | Age: 40
End: 2025-06-19
Payer: COMMERCIAL

## 2025-06-19 VITALS
DIASTOLIC BLOOD PRESSURE: 76 MMHG | BODY MASS INDEX: 25.41 KG/M2 | SYSTOLIC BLOOD PRESSURE: 118 MMHG | HEIGHT: 72 IN | WEIGHT: 187.6 LBS | OXYGEN SATURATION: 98 % | HEART RATE: 76 BPM | TEMPERATURE: 98.1 F | RESPIRATION RATE: 18 BRPM

## 2025-06-19 DIAGNOSIS — K62.5 RECTAL BLEEDING: ICD-10-CM

## 2025-06-19 DIAGNOSIS — R73.03 PREDIABETES: ICD-10-CM

## 2025-06-19 DIAGNOSIS — E78.5 DYSLIPIDEMIA: ICD-10-CM

## 2025-06-19 DIAGNOSIS — Z00.00 ANNUAL PHYSICAL EXAM: Primary | ICD-10-CM

## 2025-06-19 PROCEDURE — 99396 PREV VISIT EST AGE 40-64: CPT | Performed by: INTERNAL MEDICINE

## 2025-06-19 NOTE — PROGRESS NOTES
Adult Annual Physical  Name: Pio Jacobo      : 1985      MRN: [id number]  Encounter Provider: Jaspreet Rene DO  Encounter Date: 2025   Encounter department: [facility] [place]    Medical history of prediabetes, dyslipidemia    Here today for annual physical.  Assessment & Plan  Annual physical exam         Dyslipidemia  Recheck lipid panel  Orders:  •  CBC and differential; Future  •  Comprehensive metabolic panel; Future  •  Lipid Panel with Direct LDL reflex; Future    Prediabetes  Recheck A1c, fasting glucose  Orders:  •  Hemoglobin A1C; Future    Rectal bleeding  Now resolved.  Previously assessed for rectal bleeding.  Likely related to hemorrhoids. Colonoscopy completed 2024.  Subcentimeter polyp of the sigmoid colon removed.  Small hemorrhoids noted. Pathology consistent with hyperplastic polyp.  Negative for dysplasia.      He has not had recurrence of rectal bleeding.  He feels his symptoms may have correlated with type of granola that he was eating           Preventive Screenings:  - Diabetes Screening: screening up-to-date  - Cholesterol Screening: orders placed   - Hepatitis C screening: screening up-to-date   - HIV screening: screening up-to-date   - Colon cancer screening: screening up-to-date   - Lung cancer screening: screening not indicated   - Prostate cancer screening: screening not indicated     Immunizations:  - Immunizations due: Tdap         History of Present Illness     Adult Annual Physical:  Patient presents for annual physical.     Diet and Physical Activity:  - Diet/Nutrition: well balanced diet.  - Exercise: no formal exercise.    Depression Screening:  - PHQ-2 Score: 0    General Health:  - Sleep: 7-8 hours of sleep on average.  - Hearing: normal hearing bilateral ears.  - Vision: no vision problems and most recent eye exam < 1 year ago.  - Dental: no dental visits for > 1 year, brushes teeth twice daily and floss regularly.      Health:  - History of STDs: no.   - Urinary symptoms: none.     Advanced Care Planning:  - Has an advanced directive?: no    - Has a durable medical POA?: no    - ACP document given to patient?: no      Review of Systems      Objective   /76 (BP Location: Left arm, Patient Position: Sitting, Cuff Size: Standard)   Pulse 76   Temp 98.1 °F (36.7 °C) (Temporal)   Resp 18   Ht 6' (1.829 m)   Wt 85.1 kg (187 lb 9.6 oz)   SpO2 98%   BMI 25.44 kg/m²     Physical Exam  Constitutional:       Appearance: Normal appearance. He is not ill-appearing.   HENT:      Head: Normocephalic and atraumatic.     Eyes:      General: No scleral icterus.        Right eye: No discharge.         Left eye: No discharge.       Cardiovascular:      Rate and Rhythm: Normal rate and regular rhythm.      Heart sounds: No murmur heard.     No friction rub.   Pulmonary:      Effort: Pulmonary effort is normal.      Breath sounds: Normal breath sounds. No wheezing or rales.   Abdominal:      General: Abdomen is flat. There is no distension.      Palpations: Abdomen is soft.      Tenderness: There is no abdominal tenderness.     Musculoskeletal:         General: No swelling, tenderness or deformity.     Skin:     General: Skin is warm and dry.      Findings: No erythema.     Neurological:      Mental Status: He is alert and oriented to person, place, and time. Mental status is at baseline.      Motor: No weakness.     Psychiatric:         Mood and Affect: Mood normal.         Behavior: Behavior normal.